# Patient Record
Sex: MALE | Race: WHITE | NOT HISPANIC OR LATINO
[De-identification: names, ages, dates, MRNs, and addresses within clinical notes are randomized per-mention and may not be internally consistent; named-entity substitution may affect disease eponyms.]

---

## 2022-09-09 PROBLEM — Z00.00 ENCOUNTER FOR PREVENTIVE HEALTH EXAMINATION: Status: ACTIVE | Noted: 2022-09-09

## 2022-09-12 ENCOUNTER — APPOINTMENT (OUTPATIENT)
Dept: VASCULAR SURGERY | Facility: CLINIC | Age: 67
End: 2022-09-12

## 2022-09-12 VITALS
BODY MASS INDEX: 24.34 KG/M2 | HEART RATE: 74 BPM | HEIGHT: 70 IN | WEIGHT: 170 LBS | DIASTOLIC BLOOD PRESSURE: 71 MMHG | SYSTOLIC BLOOD PRESSURE: 118 MMHG

## 2022-09-12 DIAGNOSIS — I83.899 VARICOSE VEINS OF UNSPECIFIED LOWER EXTREMITY WITH OTHER COMPLICATIONS: ICD-10-CM

## 2022-09-12 DIAGNOSIS — I10 ESSENTIAL (PRIMARY) HYPERTENSION: ICD-10-CM

## 2022-09-12 PROCEDURE — 93970 EXTREMITY STUDY: CPT

## 2022-09-12 PROCEDURE — 36471 NJX SCLRSNT MLT INCMPTNT VN: CPT

## 2022-09-12 PROCEDURE — 99203 OFFICE O/P NEW LOW 30 MIN: CPT | Mod: 25

## 2022-09-12 RX ORDER — OMEPRAZOLE 20 MG/1
20 CAPSULE, DELAYED RELEASE ORAL
Refills: 0 | Status: ACTIVE | COMMUNITY

## 2022-09-12 RX ORDER — LOSARTAN POTASSIUM 50 MG/1
50 TABLET, FILM COATED ORAL
Refills: 0 | Status: ACTIVE | COMMUNITY

## 2022-09-12 NOTE — HISTORY OF PRESENT ILLNESS
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  Pt is on his feet and walks constantly throughout the day, denies any claudication or pain in the legs w/activity, though he does report cramping in the legs at night, and heaviness and tightness in the legs and feet at the end of the day.  Pt denies any previous episodes of ulceration or bleeding vessels, denies any previous personal or family h/o SVT/DVT.  His mother and brother had h/o varicose veins, and his brother required laser ablation for treatment.\par \par Pt is active daily, has not yet tried compression stocking therapy, but does elevate his legs at the end of the day.  He started taking Mg++ and K+ a few months ago to address his leg cramping.
Left arm;

## 2022-09-12 NOTE — PROCEDURE
[FreeTextEntry1] : LE telangiectasias injected w/sotradecol foam and injection sites dressed w/cotton gauze pressure dressings and ACE to b/l LEs

## 2022-09-12 NOTE — ASSESSMENT
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  Pt is on his feet and walks constantly throughout the day, denies any claudication or pain in the legs w/activity, though he does report cramping in the legs at night, and heaviness and tightness in the legs and feet at the end of the day.  Pt denies any previous episodes of ulceration or bleeding vessels, denies any previous personal or family h/o SVT/DVT.  His mother and brother had h/o varicose veins, and his brother required laser ablation for treatment.\par \par Pt is active daily, has not yet tried compression stocking therapy, but does elevate his legs at the end of the day.  He started taking Mg++ and K+ a few months ago to address his leg cramping.\par \par Large telangiectasias identified on exam today at the b/l medial thighs and calves, no evidence of bleeding/ulceration/infection in the legs.  Vein clusters injected w/sclerosing agent today w/good results, explained to pt that they may be discolored for some time but will minimize and future risk of bleeding.  He will f/u in our office in 3wks for reevaluation, but will continue exercise and elevation when needed.  Explained to pt that ACE bandages applied for compression today should be removed in 1d.

## 2022-09-12 NOTE — PHYSICAL EXAM
[Normal Thyroid] : the thyroid was normal [Normal Breath Sounds] : Normal breath sounds [Respiratory Effort] : normal respiratory effort [Normal Heart Sounds] : normal heart sounds [Normal Rate and Rhythm] : normal rate and rhythm [2+] : left 2+ [Varicose Veins Of Lower Extremities] : bilaterally [Ankle Swelling On The Left] : moderate [No HSM] : no hepatosplenomegaly [JVD] : no jugular venous distention  [Ankle Swelling (On Exam)] : not present [] : not present [Abdomen Masses] : No abdominal masses [Abdomen Tenderness] : ~T ~M No abdominal tenderness [No Rash or Lesion] : No rash or lesion [Purpura] : no purpura  [Petechiae] : no petechiae [Skin Ulcer] : no ulcer [Skin Induration] : no induration [Alert] : alert [Calm] : calm [de-identified] : Well-nourished, NAD [de-identified] : NC/AT, anicteric [FreeTextEntry1] : b/l medial thighs/b/l medial calf telangiectasias, no open ulcers/bleeding/erythema [de-identified] : FROM throughout, strength 5/5x4, no palpable

## 2022-09-12 NOTE — ADDENDUM
[FreeTextEntry1] : This note was written by Alvin Holland, acting as a scribe for Dr. Royer Oconnell.  I, Dr. Royer Oconnell, have read and attest that all the information, medical decision-making, and discharge instructions within are true and accurate.\par \par I, Dr. Royer Oconnell, personally performed the evaluation and management (E/M) services for this new patient.  That E/M includes conducting the initial examination, assessing all conditions, and establishing the plan of care.  Today, my ACP, Alvin Holland, was here to observe my evaluation and management services for this patient to be followed going forward.